# Patient Record
Sex: FEMALE
[De-identification: names, ages, dates, MRNs, and addresses within clinical notes are randomized per-mention and may not be internally consistent; named-entity substitution may affect disease eponyms.]

---

## 2022-07-11 ENCOUNTER — NURSE TRIAGE (OUTPATIENT)
Dept: OTHER | Facility: CLINIC | Age: 87
End: 2022-07-11

## 2022-12-27 NOTE — TELEPHONE ENCOUNTER
Subjective: Caller states \"She fell yesterday and went to the hospital and returned. But she is not acting right. \"    Hip surgery last month. She will follow simple commands, but is talking to people not there, \"Marilyn,\" trying to get out of bed. Oxygen Saturday 96% on 2 Liters. /87  HR 77    Gave PRN Tramadol. Sleeping now. Current Symptoms: confusion - worse as reported by Redd Curtis. Regine Taylor is concerned and asking if labs need to be drawn. Associated Symptoms: confusion, wanting to get out of bed    Pain Severity:  NA    Temperature: NA     Recommended disposition:  Recommendation is to be seen in the next 3-4 hours or secondary triage by on call provider. Care advice provided, patient verbalizes understanding; denies any other questions or concerns. Outcome: Warm transferred Regine Taylor to [facility] - to on call provider Deuce Mandujano NP. Per Deuce Mandujano, if she worsens send her back out to the ED for evaluation, otherwise the team will evaluate her in the morning after reviewing her chart. No change in disposition.       Reason for Disposition   [1] Longstanding confusion (e.g., dementia, stroke) AND [2] worsening    Protocols used: CONFUSION - DELIRIUM-ADULT- normal/ROM intact/normal gait/strength 5/5 bilateral upper extremities/strength 5/5 bilateral lower extremities